# Patient Record
Sex: FEMALE | Race: WHITE | Employment: FULL TIME | URBAN - METROPOLITAN AREA
[De-identification: names, ages, dates, MRNs, and addresses within clinical notes are randomized per-mention and may not be internally consistent; named-entity substitution may affect disease eponyms.]

---

## 2017-05-08 ENCOUNTER — OFFICE VISIT (OUTPATIENT)
Dept: FAMILY MEDICINE CLINIC | Facility: CLINIC | Age: 47
End: 2017-05-08

## 2017-05-08 VITALS
WEIGHT: 133 LBS | RESPIRATION RATE: 16 BRPM | SYSTOLIC BLOOD PRESSURE: 100 MMHG | TEMPERATURE: 98 F | HEIGHT: 65 IN | HEART RATE: 60 BPM | BODY MASS INDEX: 22.16 KG/M2 | DIASTOLIC BLOOD PRESSURE: 60 MMHG

## 2017-05-08 DIAGNOSIS — Z11.3 ROUTINE SCREENING FOR STI (SEXUALLY TRANSMITTED INFECTION): ICD-10-CM

## 2017-05-08 DIAGNOSIS — E78.5 DYSLIPIDEMIA: ICD-10-CM

## 2017-05-08 DIAGNOSIS — Z00.00 ROUTINE GENERAL MEDICAL EXAMINATION AT A HEALTH CARE FACILITY: Primary | ICD-10-CM

## 2017-05-08 DIAGNOSIS — K76.6 PORTAL HYPERTENSION (HCC): ICD-10-CM

## 2017-05-08 PROCEDURE — 99396 PREV VISIT EST AGE 40-64: CPT | Performed by: INTERNAL MEDICINE

## 2017-05-08 NOTE — PROGRESS NOTES
Wellness Exam    REASON FOR VISIT:    Izabel Hernandez is a 40year old female who presents for an [address]     Current Complaints: none  Flu shot: declined   Health Maintenance Topics with due status: Overdue       Topic Date Due    Mammo 3 yrs age 21-65 or Pap and HPV every 5 yrs age 33-67 Pap Smear,6 Years due on 06/12/2020    Chlamydia Screening Screen Annually age<25, if sex active/on OCPs; >24 high risk No results found for: CHLAMYDIA    Colonoscopy Screen Every 10 years Colonoscopy,3 abuse    • Portal hypertension ([facility] [address] 11/17/2014     Gastropathy egd 2014   • Diverticula of colon 11/17/2014     c scope 2014 tito   • Colon polyp 11/17/2014   • BV (bacterial vaginosis) 11/13/2015   • Hand pain 2/12/2016   • Raynaud's disease 2/12/2016 No murmur heard. Pulmonary/Chest: Effort normal and breath sounds normal. She has no wheezes. She has no rales. Abdominal: Soft. Bowel sounds are normal. There is no tenderness. Musculoskeletal: Normal range of motion. She exhibits no edema.    Lymph Antibody [E]  Hepatitis B Surface Antigen [E]  Hepatitis B Surface Antibody [E]    Imaging & Consults:  None    Her 5 year prevention plan includes: annual visits for fasting labs  Mammogram,1 Yr due on 09/04/2011  Annual Depression Screen due on 02/12/201

## 2017-05-08 NOTE — PATIENT INSTRUCTIONS
Thank you for choosing Guerrero Leong MD at [place] [address]  To Do: Josephine Ansari  1. Low carb diet read below  2.  Fasting labs are due again see times below don't eat 8 hours before the lab test, water is ok    BAD carbs   - soft drinks   - sport d [phone] usually in [address] [facility] in Clinic in [place] at [facility].  [address] Mon-Fri at 8am-7:30pm, and Sat/Sun 9am-430pm  Also at [address]  Call [phone] for info    • Please call our office about any questions regarding your t

## 2017-05-11 ENCOUNTER — NURSE ONLY (OUTPATIENT)
Dept: FAMILY MEDICINE CLINIC | Facility: CLINIC | Age: 47
End: 2017-05-11

## 2017-05-11 ENCOUNTER — LAB ENCOUNTER (OUTPATIENT)
Dept: LAB | Age: 47
End: 2017-05-11
Attending: INTERNAL MEDICINE
Payer: COMMERCIAL

## 2017-05-11 DIAGNOSIS — Z30.9 ENCOUNTER FOR CONTRACEPTIVE MANAGEMENT, UNSPECIFIED TYPE: Primary | ICD-10-CM

## 2017-05-11 DIAGNOSIS — Z00.00 ROUTINE GENERAL MEDICAL EXAMINATION AT A HEALTH CARE FACILITY: ICD-10-CM

## 2017-05-11 DIAGNOSIS — Z11.3 ROUTINE SCREENING FOR STI (SEXUALLY TRANSMITTED INFECTION): ICD-10-CM

## 2017-05-11 DIAGNOSIS — E78.5 DYSLIPIDEMIA: ICD-10-CM

## 2017-05-11 PROCEDURE — 87340 HEPATITIS B SURFACE AG IA: CPT | Performed by: INTERNAL MEDICINE

## 2017-05-11 PROCEDURE — 86706 HEP B SURFACE ANTIBODY: CPT | Performed by: INTERNAL MEDICINE

## 2017-05-11 PROCEDURE — 36415 COLL VENOUS BLD VENIPUNCTURE: CPT | Performed by: INTERNAL MEDICINE

## 2017-05-11 PROCEDURE — 86803 HEPATITIS C AB TEST: CPT | Performed by: INTERNAL MEDICINE

## 2017-05-11 PROCEDURE — 86592 SYPHILIS TEST NON-TREP QUAL: CPT | Performed by: INTERNAL MEDICINE

## 2017-05-11 PROCEDURE — 96372 THER/PROPH/DIAG INJ SC/IM: CPT | Performed by: INTERNAL MEDICINE

## 2017-05-11 PROCEDURE — 85025 COMPLETE CBC W/AUTO DIFF WBC: CPT | Performed by: INTERNAL MEDICINE

## 2017-05-11 PROCEDURE — 80053 COMPREHEN METABOLIC PANEL: CPT | Performed by: INTERNAL MEDICINE

## 2017-05-11 PROCEDURE — 80061 LIPID PANEL: CPT | Performed by: INTERNAL MEDICINE

## 2017-05-11 PROCEDURE — 87389 HIV-1 AG W/HIV-1&-2 AB AG IA: CPT | Performed by: INTERNAL MEDICINE

## 2017-05-11 RX ORDER — MEDROXYPROGESTERONE ACETATE 150 MG/ML
150 INJECTION, SUSPENSION INTRAMUSCULAR ONCE
Status: COMPLETED | OUTPATIENT
Start: 2017-05-11 | End: 2017-05-11

## 2017-05-11 RX ADMIN — MEDROXYPROGESTERONE ACETATE 150 MG: 150 INJECTION, SUSPENSION INTRAMUSCULAR at 09:47:00

## 2017-05-15 NOTE — PROGRESS NOTES
Quick Note:    All of your std testing was negative. You cholesterol was a little elevated, continue w/ healthy diet and exercise daily.   ______

## 2018-06-02 ENCOUNTER — LAB ENCOUNTER (OUTPATIENT)
Dept: LAB | Age: 48
End: 2018-06-02
Attending: FAMILY MEDICINE

## 2018-06-02 ENCOUNTER — OFFICE VISIT (OUTPATIENT)
Dept: FAMILY MEDICINE CLINIC | Facility: CLINIC | Age: 48
End: 2018-06-02

## 2018-06-02 VITALS
SYSTOLIC BLOOD PRESSURE: 110 MMHG | HEIGHT: 65 IN | RESPIRATION RATE: 16 BRPM | HEART RATE: 70 BPM | BODY MASS INDEX: 23.16 KG/M2 | WEIGHT: 139 LBS | TEMPERATURE: 98 F | DIASTOLIC BLOOD PRESSURE: 72 MMHG

## 2018-06-02 DIAGNOSIS — Z13.228 SCREENING FOR ENDOCRINE, METABOLIC AND IMMUNITY DISORDER: ICD-10-CM

## 2018-06-02 DIAGNOSIS — F17.200 TOBACCO DEPENDENCE: ICD-10-CM

## 2018-06-02 DIAGNOSIS — Z13.0 SCREENING FOR ENDOCRINE, METABOLIC AND IMMUNITY DISORDER: ICD-10-CM

## 2018-06-02 DIAGNOSIS — Z00.00 WELLNESS EXAMINATION: ICD-10-CM

## 2018-06-02 DIAGNOSIS — Z13.29 SCREENING FOR ENDOCRINE, METABOLIC AND IMMUNITY DISORDER: ICD-10-CM

## 2018-06-02 DIAGNOSIS — R53.83 FATIGUE, UNSPECIFIED TYPE: ICD-10-CM

## 2018-06-02 DIAGNOSIS — Z00.00 WELLNESS EXAMINATION: Primary | ICD-10-CM

## 2018-06-02 PROCEDURE — 99213 OFFICE O/P EST LOW 20 MIN: CPT | Performed by: FAMILY MEDICINE

## 2018-06-02 PROCEDURE — 81001 URINALYSIS AUTO W/SCOPE: CPT

## 2018-06-02 PROCEDURE — 83036 HEMOGLOBIN GLYCOSYLATED A1C: CPT

## 2018-06-02 PROCEDURE — 80061 LIPID PANEL: CPT

## 2018-06-02 PROCEDURE — 84443 ASSAY THYROID STIM HORMONE: CPT

## 2018-06-02 PROCEDURE — 80053 COMPREHEN METABOLIC PANEL: CPT

## 2018-06-02 PROCEDURE — 82306 VITAMIN D 25 HYDROXY: CPT

## 2018-06-02 PROCEDURE — 36415 COLL VENOUS BLD VENIPUNCTURE: CPT

## 2018-06-02 PROCEDURE — 85025 COMPLETE CBC W/AUTO DIFF WBC: CPT

## 2018-06-02 PROCEDURE — 82607 VITAMIN B-12: CPT

## 2018-06-02 PROCEDURE — 99386 PREV VISIT NEW AGE 40-64: CPT | Performed by: FAMILY MEDICINE

## 2018-06-02 RX ORDER — VARENICLINE TARTRATE 1 MG/1
1 TABLET, FILM COATED ORAL 2 TIMES DAILY
Qty: 60 TABLET | Refills: 6 | Status: SHIPPED | OUTPATIENT
Start: 2018-06-02

## 2018-06-02 NOTE — PATIENT INSTRUCTIONS
Thank you for choosing Ray Flaherty MD at [address]   To Do: Tess Valle  1. Please see age appropriate health prevention below    [employer] is located in [address]. Monday, Tuesday & Friday – 8 a.m. to 4 p.m.   Wednesday, [place] • Please call our office about any questions regarding your treatment/medicines/tests as a result of today's visit.  For your safety, read the entire package insert of all medicines prescribed to you and be aware of all of the risks of treatment even beyon Screening tests and vaccines are an important part of managing your health. Health counseling is essential, too. Below are guidelines for these, for women ages 36 to 52. Talk with your healthcare provider to make sure you’re up-to-date on what you need.   Tiffanie Rowland Syphilis Women at increased risk for infection–talk with your healthcare provider At routine exams   Tuberculosis Women at increased risk for infection–talk with your healthcare provider Ask your healthcare provider   Vision All women in this age group [de-identified] Breast cancer and chemoprevention Women at high risk for breast cancer When your risk is known   Diet and exercise Women who are overweight or obese When diagnosed, and then at routine exams   Domestic violence Women at the age in which they are able to ha

## 2018-06-02 NOTE — H&P
Wellness Exam    REASON FOR VISIT:    Vashti Mueller is a 56year old female who presents for an [address]     Current Complaints: Ms. Kaba Kasia is a pleasant 69-year-old female here for her wellness exam  Flu shot: see immunization record Cancer Screening   Every 2 yrs age 54-69 Mammogram due on 09/04/2011    Pap Every 3 yrs age 21-65 or Pap and HPV every 5 yrs age 33-67 Pap Smear,6 Years due on 06/12/2020    Chlamydia Screening Screen Annually age<25, if sex active/on OCPs; >24 high risk N package directions Disp: 1 Package Rfl: 0   Varenicline Tartrate (CHANTIX CONTINUING MONTH RAJESH) 1 MG Oral Tab Take 1 tablet (1 mg total) by mouth 2 (two) times daily.  Disp: 60 tablet Rfl: 6      MEDICAL INFORMATION:   Past Medical History:   Diagnosis Date signs reviewed as noted  Head: Normocephalic and atraumatic. Nose: Nose normal.   Eyes: EOM are normal. Pupils are equal, round, and reactive to light. No scleral icterus. Neck: Normal range of motion. No thyromegaly present.    Cardiovascular: Normal r STARTING MONTH RAJESH) 0.5 MG X 11 & 1 MG X 42 Oral Misc; As per package directions  -     Varenicline Tartrate (CHANTIX CONTINUING MONTH RAJESH) 1 MG Oral Tab; Take 1 tablet (1 mg total) by mouth 2 (two) times daily.     Continue with lifestyle regimen and agree Benign skin lesion of neck     Swelling of both hands     Hand arthritis     Hand pain     Raynaud's disease    PREVENTIVE VISIT,NEW,40-64

## 2018-06-02 NOTE — PROGRESS NOTES
HPI:    Patient ID: Ritchie Lazo is a 56year old female. HPI  Ms. Kay Oro is a pleasant 55-year-old female was been generally healthy but has been smoking for several years already.   She is initially here for her wellness exam.  She reports that and moist. No oropharyngeal exudate. Eyes: Conjunctivae and EOM are normal. Pupils are equal, round, and reactive to light. No scleral icterus. Neck: Neck supple. No thyromegaly present.    Cardiovascular: Normal rate, regular rhythm and normal heart so

## 2018-06-06 ENCOUNTER — TELEPHONE (OUTPATIENT)
Dept: FAMILY MEDICINE CLINIC | Facility: CLINIC | Age: 48
End: 2018-06-06

## 2018-06-06 NOTE — TELEPHONE ENCOUNTER
Pt would like lab order faxed to [facility] in [place].   Fax [phone] Ph. [phone]

## 2018-06-13 ENCOUNTER — TELEPHONE (OUTPATIENT)
Dept: FAMILY MEDICINE CLINIC | Facility: CLINIC | Age: 48
End: 2018-06-13

## 2018-06-13 NOTE — TELEPHONE ENCOUNTER
Received a PA request from [facility] regarding Chantix. Chantix was prescribed on 6/2/18 during office visit with Lizet Appiah. Patient has not tried and failed any OTC or prescribed medications.     Please advise if you would like to try either Bupropion

## (undated) NOTE — Clinical Note
Date: 5/8/2017    Patient Name: Carlos Parker          To Whom it may concern:    Patient was seen in office today for a wellness exam.  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   She had a reported TB test in [facility] which

## (undated) NOTE — MR AVS SNAPSHOT
[facility] [address]  [address] [place] [facility] Brie Rowan  [phone]               Thank you for choosing us for your health care visit with Tyao Hall MD.  We are glad to serve you and happy to provide you with t Routine general medical examination at a health care facility          Instructions and Information about Your Health    Thank you for choosing Ranjan Loyola MD at [place] [address]  To Do: Izabel Hernandez  1. Low carb diet read below  2.  Fasting labs [phone] (For example: Holter Monitor, Cardiac Stress tests,Event Monitor, or 2D Echocardiograms)  •[facility] call [phone] usually in [address] [facility] in Clinic in [place] at [facility].  [address] Mon-Fri at 8am-7:30pm, and Sat/Sun 9am-4 To best provide you care, patients receiving maintenance medications need to be seen at least twice a year. .         Allergies as of May 08, 2017     No Known Allergies                Today's Vital Signs     BP Pulse Temp Height Weight BMI    100/60 mmHg 6